# Patient Record
Sex: FEMALE | Race: WHITE | NOT HISPANIC OR LATINO | Employment: UNEMPLOYED | URBAN - METROPOLITAN AREA
[De-identification: names, ages, dates, MRNs, and addresses within clinical notes are randomized per-mention and may not be internally consistent; named-entity substitution may affect disease eponyms.]

---
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
## 2020-04-13 ENCOUNTER — TELEPHONE (OUTPATIENT)
Dept: PEDIATRICS CLINIC | Facility: MEDICAL CENTER | Age: 20
End: 2020-04-13

## 2021-03-24 NOTE — PROGRESS NOTES
Assessment/Plan:    Diagnoses and all orders for this visit:    Single current episode of major depressive disorder, unspecified depression episode severity  -     FLUoxetine (PROzac) 10 mg capsule; Take 1 capsule (10 mg total) by mouth daily      Start fluoxetine as above  Follow up in 2 weeks for recheck  Call immediately with any worsening mood or suicidal ideation  Also recommend starting therapy  Advised mom to call insurance company for available providers  Subjective:     Patient ID: Francia Mayers is a 16 y o  female    Here with mom for eval for depression  Per patient, she feels that everything gets very overwhelming very quickly  Maybe combination with anxiety  Gets overwhelmed by amount of schoolwork and then mood makes it worse  Feels sad even when she should feel happy but not for a particular reason  Starting feeling this way in middle school but just dealt with it on own and was able to get work done  Now struggles to do schoolwork  Grades are suffering  Will do well on tests but won't do homework because just doesn't have energy and affects her grades  In AP classes and working on college apps  Feels more tired  Sometime takes naps after school, has hard time falling a asleep  Sometimes lashes out because of mood even though she doesn't mean to  Does still enjoy being with friends and drawing but still sometimes feels sad even during these times  No thoughts of hurting self or someone else  Has told school counselor and some friends but doesn't really like to talk about it much  The following portions of the patient's history were reviewed and updated as appropriate:   She  has a past medical history of Acute meniscal injury of knee, left; Allergic rhinitis; and Right wrist sprain  She There are no active problems to display for this patient  She  has no past surgical history on file    Her family history includes Celiac disease in her mother; Depression in her father and mother; Diabetes in her maternal grandfather and paternal grandfather  She  reports that she has never smoked  She has never used smokeless tobacco  She reports that she does not drink alcohol or use drugs  No current outpatient prescriptions on file prior to visit  No current facility-administered medications on file prior to visit  She has No Known Allergies       Review of Systems   Psychiatric/Behavioral: Positive for dysphoric mood  All other systems reviewed and are negative  Objective:    Vitals:    03/21/18 1040   BP: (!) 105/69   Pulse: 64   Resp: 18   Temp: 98 4 °F (36 9 °C)   Weight: 55 6 kg (122 lb 9 6 oz)   Height: 4' 11 15" (1 503 m)       Physical Exam   Constitutional: She is oriented to person, place, and time  She appears well-developed and well-nourished  No distress  HENT:   Head: Normocephalic and atraumatic  Right Ear: Tympanic membrane normal    Left Ear: Tympanic membrane normal    Mouth/Throat: Oropharynx is clear and moist    Eyes: Conjunctivae are normal  Pupils are equal, round, and reactive to light  Neck: Neck supple  No thyromegaly present  Cardiovascular: Normal rate, regular rhythm and normal heart sounds  No murmur heard  Pulmonary/Chest: Effort normal and breath sounds normal  No respiratory distress  Abdominal: Soft  She exhibits no distension and no mass  There is no tenderness  Lymphadenopathy:     She has no cervical adenopathy  Neurological: She is alert and oriented to person, place, and time  Skin: Skin is warm and dry  Psychiatric: She has a normal mood and affect   Judgment normal  same name as above